# Patient Record
Sex: FEMALE | Race: WHITE | NOT HISPANIC OR LATINO | Employment: OTHER | URBAN - METROPOLITAN AREA
[De-identification: names, ages, dates, MRNs, and addresses within clinical notes are randomized per-mention and may not be internally consistent; named-entity substitution may affect disease eponyms.]

---

## 2017-06-05 ENCOUNTER — OFFICE VISIT (OUTPATIENT)
Dept: OPHTHALMOLOGY | Facility: CLINIC | Age: 77
End: 2017-06-05

## 2017-06-05 DIAGNOSIS — H52.223 REGULAR ASTIGMATISM, BILATERAL: ICD-10-CM

## 2017-06-05 DIAGNOSIS — H35.3290 EXUDATIVE SENILE MACULAR DEGENERATION OF RETINA (H): Primary | ICD-10-CM

## 2017-06-05 DIAGNOSIS — H35.369 DRUSEN (DEGENERATIVE) OF RETINA, UNSPECIFIED LATERALITY: ICD-10-CM

## 2017-06-05 ASSESSMENT — SLIT LAMP EXAM - LIDS
COMMENTS: NORMAL
COMMENTS: NORMAL

## 2017-06-05 ASSESSMENT — REFRACTION_MANIFEST
OS_AXIS: 062
OD_CYLINDER: +0.50
OD_SPHERE: PLANO
OD_ADD: +2.75
OS_SPHERE: +0.25
OD_AXIS: 050
OS_ADD: +2.75
OS_CYLINDER: +0.50

## 2017-06-05 ASSESSMENT — VISUAL ACUITY
CORRECTION_TYPE: GLASSES
OD_CC: 20/20-
OS_CC: 20/30-/+
METHOD: SNELLEN - LINEAR

## 2017-06-05 ASSESSMENT — REFRACTION_WEARINGRX
OS_AXIS: 006
OD_CYLINDER: +0.50
OD_SPHERE: PLANO
OD_ADD: +2.50
OS_SPHERE: PLANO
OS_CYLINDER: +0.50
OS_ADD: +2.50
OD_AXIS: 047
SPECS_TYPE: PAL

## 2017-06-05 ASSESSMENT — TONOMETRY
OD_IOP_MMHG: 15
IOP_METHOD: ICARE
OS_IOP_MMHG: 14

## 2017-06-05 ASSESSMENT — CUP TO DISC RATIO
OD_RATIO: 0.25
OS_RATIO: 0.25

## 2017-06-05 ASSESSMENT — CONF VISUAL FIELD
OD_NORMAL: 1
METHOD: COUNTING FINGERS
OS_NORMAL: 1

## 2017-06-05 ASSESSMENT — EXTERNAL EXAM - LEFT EYE: OS_EXAM: NORMAL

## 2017-06-05 ASSESSMENT — EXTERNAL EXAM - RIGHT EYE: OD_EXAM: NORMAL

## 2017-06-05 NOTE — NURSING NOTE
"Chief Complaints and History of Present Illnesses   Patient presents with     COMPREHENSIVE EYE EXAM     HPI    Affected eye(s):  Both   Symptoms:     Difficulty with reading (Comment: vision seems to be more impaired at near)      Duration:  1 year   Frequency:  Constant       Do you have eye pain now?:  No      Comments:  Eyes occasionally just \"ache\" at the end of the day when she has been reading but it also happens other times - transient    Avril Denson, HETAL 8:24 AM 06/05/2017               "

## 2017-06-05 NOTE — PROGRESS NOTES
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 or sooner as needed.    Complete documentation of historical and exam elements from today's encounter can  be found in the full encounter summary report (not reduplicated in this progress  note). I personally obtained the chief complaint(s) and history of present illness. I  confirmed and edited as necessary the review of systems, past medical/surgical  history, family history, social history, and examination findings as documented by  others; and I examined the patient myself. I personally reviewed the relevant tests,  images, and reports as documented above. I formulated and edited as necessary the  assessment and plan and discussed the findings and management plan with the  patient and family.    DAVID Barrow M.D

## 2017-06-05 NOTE — MR AVS SNAPSHOT
After Visit Summary   6/5/2017    Edyta Rivas    MRN: [id number]           Patient Information     Date Of Birth          1940        Visit Information        Provider Department      6/5/2017 8:20 AM Preet Barrow MD [facility]        Today's Diagnoses     Exudative senile macular degeneration of retina (H) - Left Eye    -  1    Drusen (degenerative) of retina, unspecified laterality - Both Eyes        Regular astigmatism, bilateral           Follow-ups after your visit        Follow-up notes from your care team     Return in about 1 year (around 6/5/2018) for Complete Eye Exam, ARMD.      Who to contact     Please call your clinic at [phone] to:    Ask questions about your health    Make or cancel appointments    Discuss your medicines    Learn about your test results    Speak to your doctor   If you have compliments or concerns about an experience at your clinic, or if you wish to file a complaint, please contact [facility] Patient Relations at [phone] or email us at [email or web address]         Additional Information About Your Visit        MyChart Information     BeiBei gives you secure access to your electronic health record. If you see a primary care provider, you can also send messages to your care team and make appointments. If you have questions, please call your primary care clinic.  If you do not have a primary care provider, please call [phone] and they will assist you.      BeiBei is an electronic gateway that provides easy, online access to your medical records. With BeiBei, you can request a clinic appointment, read your test results, renew a prescription or communicate with your care team.     To access your existing account, please contact your [facility] or call [phone] for assistance.        Care EveryWhere ID     This is your Care EveryWhere ID.  This could be used by other organizations to access your [place] medical records  [id number]         Blood Pressure from Last 3 Encounters:   11/17/08 130/58   11/10/08 123/68    Weight from Last 3 Encounters:   No data found for Wt              Today, you had the following     No orders found for display       Primary Care Provider Office Phone # Fax #    Carolyn Sarkar [phone]       [facility] [address] [place] [address]        Thank you!     Thank you for choosing [facility]  for your care. Our goal is always to provide you with excellent care. Hearing back from our patients is one way we can continue to improve our services. Please take a few minutes to complete the written survey that you may receive in the mail after your visit with us. Thank you!             Your Updated Medication List - Protect others around you: Learn how to safely use, store and throw away your medicines at www.disposemymeds.org.          This list is accurate as of: 6/5/17  9:25 AM.  Always use your most recent med list.                   Brand Name Dispense Instructions for use    aspirin 81 MG tablet      Take by mouth daily       bevacizumab 1.25MG/0.05ML intravitreal inj    AVASTIN     1.25 mg by Intravitreal route once Left eye every 4 weeks       cholecalciferol 1000 UNITS Tabs          glucosamine-chondroitin 500-400 MG Caps per capsule      Take 1 capsule by mouth daily       ICAPS MV PO          OMEGA-3 FISH OIL PO          traZODone 50 MG tablet    DESYREL     Take by mouth At Bedtime       ZETIA PO

## 2018-06-06 ENCOUNTER — OFFICE VISIT (OUTPATIENT)
Dept: OPHTHALMOLOGY | Facility: CLINIC | Age: 78
End: 2018-06-06
Payer: COMMERCIAL

## 2018-06-06 DIAGNOSIS — H35.3110 DRY AGE-RELATED MACULAR DEGENERATION OF RIGHT EYE: ICD-10-CM

## 2018-06-06 DIAGNOSIS — Z96.1 PSEUDOPHAKIA, BOTH EYES: ICD-10-CM

## 2018-06-06 DIAGNOSIS — H52.03 HYPERMETROPIA OF BOTH EYES: ICD-10-CM

## 2018-06-06 DIAGNOSIS — H43.813 PVD (POSTERIOR VITREOUS DETACHMENT), BILATERAL: ICD-10-CM

## 2018-06-06 DIAGNOSIS — H35.3290 EXUDATIVE SENILE MACULAR DEGENERATION OF RETINA (H): Primary | ICD-10-CM

## 2018-06-06 ASSESSMENT — REFRACTION_WEARINGRX
OD_CYLINDER: +0.50
OD_ADD: +2.75
OS_SPHERE: +0.25
OD_SPHERE: PLANO
OS_AXIS: 060
SPECS_TYPE: PAL
OS_CYLINDER: +0.50
OS_ADD: +2.75
OD_AXIS: 048

## 2018-06-06 ASSESSMENT — VISUAL ACUITY
OS_CC: J5
OD_CC: 20/20
METHOD: SNELLEN - LINEAR
OS_CC: 20/40
OD_CC+: -2
OD_CC: J1

## 2018-06-06 ASSESSMENT — REFRACTION_MANIFEST
OD_CYLINDER: +0.50
OD_ADD: +3.00
OD_AXIS: 040
OS_ADD: +3.00
OD_SPHERE: +0.25
OS_AXIS: 074
OS_CYLINDER: +0.75
OS_SPHERE: +0.25

## 2018-06-06 ASSESSMENT — CONF VISUAL FIELD
METHOD: COUNTING FINGERS
OD_NORMAL: 1
OS_NORMAL: 1

## 2018-06-06 ASSESSMENT — EXTERNAL EXAM - LEFT EYE: OS_EXAM: NORMAL

## 2018-06-06 ASSESSMENT — CUP TO DISC RATIO
OS_RATIO: 0.25
OD_RATIO: 0.25

## 2018-06-06 ASSESSMENT — EXTERNAL EXAM - RIGHT EYE: OD_EXAM: NORMAL

## 2018-06-06 ASSESSMENT — TONOMETRY
OD_IOP_MMHG: 14
IOP_METHOD: TONOPEN
OS_IOP_MMHG: 15

## 2018-06-06 ASSESSMENT — SLIT LAMP EXAM - LIDS
COMMENTS: NORMAL
COMMENTS: NORMAL

## 2018-06-06 NOTE — MR AVS SNAPSHOT
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      Who to contact     Please call your clinic at [phone] to:    Ask questions about your health    Make or cancel appointments    Discuss your medicines    Learn about your test results    Speak to your doctor            Additional Information About Your Visit        [employer] Information     Values of n gives you secure access to your electronic health record. If you see a primary care provider, you can also send messages to your care team and make appointments. If you have questions, please call your primary care clinic.  If you do not have a primary care provider, please call [phone] and they will assist you.      Values of n is an electronic gateway that provides easy, online access to your medical records. With Values of n, you can request a clinic appointment, read your test results, renew a prescription or communicate with your care team.     To access your existing account, please contact your [facility] or call [phone] for assistance.        Care EveryWhere ID     This is your Care EveryWhere ID. This could be used by other organizations to access your [place] medical records  [id number]         Blood Pressure from Last 3  Encounters:   11/17/08 130/58   11/10/08 123/68    Weight from Last 3 Encounters:   No data found for Wt              We Performed the Following     REFRACTION        Primary Care Provider Office Phone # Fax #    Carolyn Sarkar [phone]       [facility] [address] [place] [address] E  [place] [address]        Equal Access to Services     [facility]: Hadii aad ku hadasho Soomaali, waaxda luqadaha, qaybta kaalmada adeegyada, waxay idiin hayaan adenu ralph lasergio . So [facility] [phone].    ATENCIÓN: Si habla español, tiene a lazcano disposición servicios gratuitos de asistencia lingüística. Lashaeame al [phone].    We comply with applicable federal civil rights laws and [place] laws. We do not discriminate on the basis of race, color, national origin, age, disability, sex, sexual orientation, or gender identity.            Thank you!     Thank you for choosing [facility]  for your care. Our goal is always to provide you with excellent care. Hearing back from our patients is one way we can continue to improve our services. Please take a few minutes to complete the written survey that you may receive in the mail after your visit with us. Thank you!             Your Updated Medication List - Protect others around you: Learn how to safely use, store and throw away your medicines at www.disposemymeds.org.          This list is accurate as of 6/6/18 10:30 AM.  Always use your most recent med list.                   Brand Name Dispense Instructions for use Diagnosis    aspirin 81 MG tablet      Take by mouth daily        bevacizumab 25 mg/mL intravitreal inj    AVASTIN     1.25 mg by Intravitreal route once Left eye every 4 weeks        cholecalciferol 1000 units Tabs           glucosamine-chondroitin 500-400 MG Caps per capsule      Take 1 capsule by mouth daily        ICAPS MV PO           OMEGA-3 FISH OIL PO           traZODone 50 MG tablet    DESYREL     Take by mouth At  Bedtime        ZETIA PO

## 2018-06-06 NOTE — PROGRESS NOTES
HPI  Edyta Rivas is a 78 year old female here for comprehensive eye exam.  Has mild blurry vision LEFT eye with current glasses, particularly with cooking and reading.  Has magnifier she uses occasionally.  Denies eye pain, or irritation.  Seeing Dr. Washburn every 5 weeks for Avastin injections left eye.       PMH: aortic stenosis, hyperlipidemia, on asa  POH: Glasses for hypermetropia, cataract extraction/ intraocular lens implant both eyes 2016, s/p YAG capsulotomy left, dry age related macular degeneration right eye, wet age related macular degeneration left eye - NOEMI Washburn follows  Oc Meds: none  FH: Denies any glaucoma, age related macular degeneration, or other known eye diseases         Assessment & Plan   (H35.4690) Exudative senile macular degeneration of retina (H) - Left Eye  (primary encounter diagnosis)  Comment: good response to avastin, continues getting injections  Plan: follow with NOEMI Washburn    (H52.03) 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  Comment: minimal change  Plan: REFRACTION- prescription given to update as needed with slight increase in add, advised patient to wait unless current glasses in poor condition    (H35.5600) Dry age-related macular degeneration of right eye - Right Eye  Comment: taking I vites  Plan: follow with NOEMI Washburn, continue I vites    (Z96.1) Pseudophakia, both eyes - Both Eyes  Comment: s/p YAG capsulotomy left eye, clear media right eye   Plan: follow     (H43.819) PVD (posterior vitreous detachment), unspecified laterality - right Eye  Comment:  Likely old   Plan:  Natural history of posterior vitreous detachment as well as retinal tear/detachment symptoms discussed with patient.  Told to call for prompt dilated exam if these symptoms occur.      -----------------------------------------------------------------------------------       Patient disposition:   Return in about 1 year (around 6/6/2019) for Comprehensive Exam.      Complete documentation of historical  and exam elements from today's encounter can be found in the full encounter summary report (not reduplicated in this progress note). I personally obtained the chief complaint(s) and history of present illness.  I have confirmed and edited as necessary the CC, HPI, PMH/PSH, social history, FMH, ROS, and exam/neuro findings as obtained by the technician or others. I have examined this patient myself and I personally viewed the image(s) and studies listed above and the documentation reflects my findings and interpretation.  I formulated and edited as necessary the assessment and plan and discussed the findings and management plan with the patient and family.     Clarisa Bloom MD

## 2018-06-06 NOTE — NURSING NOTE
Chief Complaints and History of Present Illnesses   Patient presents with     Annual Eye Exam     HPI    Affected eye(s):  Both   Symptoms:     Decreased vision (Comment: at near)   Difficulty with reading (Comment: at near)   No floaters   No flashes   No Dryness      Unknown duration Other:  gradual   Frequency:  Constant       Do you have eye pain now?:  No      Comments:  Has wet AMD and notes diminished VA at near. Received injection [facility].  Icaps BID.  Denies art tears.  Rosalba FERNANDO 9:33 AM 06/06/2018

## 2018-12-17 ENCOUNTER — TRANSFERRED RECORDS (OUTPATIENT)
Dept: HEALTH INFORMATION MANAGEMENT | Facility: CLINIC | Age: 78
End: 2018-12-17

## 2019-02-11 ENCOUNTER — PRE VISIT (OUTPATIENT)
Dept: ORTHOPEDICS | Facility: CLINIC | Age: 79
End: 2019-02-11

## 2019-02-11 NOTE — TELEPHONE ENCOUNTER
RECORDS RECEIVED FROM: JG   DATE RECEIVED: IN CARE EVERYWHERE   NOTES STATUS DETAILS   OFFICE NOTE from referring provider CARE EVERY WHERE    OFFICE NOTE from other specialist CARE EVERYWHERE    DISCHARGE SUMMARY from hospital N/A    DISCHARGE REPORT from the ER N/A    OPERATIVE REPORT N/A    MEDICATION LIST CARE EVERYWHERE    IMPLANT RECORD/STICKER N/A    LABS     CBC/DIFF N/A    CULTURES N/A    INJECTIONS DONE IN RADIOLOGY N/A    MRI N/A    CT SCAN N/A    XRAYS (IMAGES & REPORTS) Received IN PACS   TUMOR     PATHOLOGY  Slides & report N/A

## 2019-02-20 ASSESSMENT — ENCOUNTER SYMPTOMS
MYALGIAS: 1
NECK PAIN: 0
STIFFNESS: 1
DIFFICULTY URINATING: 0
MUSCLE WEAKNESS: 0
EYE IRRITATION: 0
HEMATURIA: 0
FLANK PAIN: 0
DOUBLE VISION: 0
BACK PAIN: 1
ARTHRALGIAS: 0
MUSCLE CRAMPS: 1
JOINT SWELLING: 0
DYSURIA: 0
EYE WATERING: 1
EYE PAIN: 1
EYE REDNESS: 0

## 2019-03-06 ENCOUNTER — ANCILLARY PROCEDURE (OUTPATIENT)
Dept: GENERAL RADIOLOGY | Facility: CLINIC | Age: 79
End: 2019-03-06
Attending: ORTHOPAEDIC SURGERY
Payer: COMMERCIAL

## 2019-03-06 ENCOUNTER — OFFICE VISIT (OUTPATIENT)
Dept: ORTHOPEDICS | Facility: CLINIC | Age: 79
End: 2019-03-06
Payer: COMMERCIAL

## 2019-03-06 VITALS — BODY MASS INDEX: 25.58 KG/M2 | WEIGHT: 139 LBS | HEIGHT: 62 IN

## 2019-03-06 DIAGNOSIS — M53.3 SACRALGIA: Primary | ICD-10-CM

## 2019-03-06 DIAGNOSIS — M41.9 SCOLIOSIS: Primary | ICD-10-CM

## 2019-03-06 ASSESSMENT — MIFFLIN-ST. JEOR: SCORE: 1053.75

## 2019-03-06 NOTE — LETTER
3/6/2019       RE: Edyta Rivas  [address]  [place] [address]     Dear Colleague,    Thank you for referring your patient, Edyta Rivas, to the [facility]. Please see a copy of my visit note below.    [facility]  Orthopedics  Jorge Luis Fierro MD  2019     Name: Edyta Rivas  MRN: [id number]  Age: 79 year old  : 1940  Referring provider: Mason Iniguez     Chief Complaint: Consult (si joint pain )     Date of Injury: unk    History of Present Illness:   Edyta Rivas is a 79 year old, right handed female who presents today for evaluation of lower back pain.    Mrs. Rivas is a very pleasant 79-year-old female who around start of the new year developed lower back and tailbone pain without inciting event.  She had no antecedent back pain prior to this event.  She does have a notable orthopedic spine history consisting of a L2 vertebroplasty back in around  for osteoporotic compression fracture.  She has a past medical history notable for hypertension, osteoporosis, and scoliosis since childhood.  She does undergo an annual once a year infusion for osteoporosis treatment.  Otherwise she has not undergone any recent focused physical therapy on her spine and has received no form of corticosteroid injection.    Patient reports that the onset of this pain at the new year was most bothersome when she would sit on hard surfaces or for extended periods of time.  Over the last 3 months though this pain has greatly improved and nearly resolved without intervention.  She has been basically using more questions on harder sitting surfaces I modified her activity.  She has been taking anti-inflammatories for pain control but otherwise has not had any residual impairment of her ability to ambulate.  She is very active at baseline and ambulates independently without assist device.  She has no radiating pain into her buttocks  or her legs bilaterally or upper spine.  She does not recall any form of trauma such as a low energy fall.    Oswestry (JELENA) Questionnaire    OSWESTRY DISABILITY INDEX 2/20/2019   Count 10   Sum 4   Oswestry Score (%) 8   Some recent data might be hidden        PROMIS-10 Scores  Global Mental Health Score: (P) 19  Global Physical Health Score: (P) 16  PROMIS TOTAL - SUBSCORES: (P) 35       Review of Systems:   A 10-point review of systems was obtained and is negative except for as noted in the HPI.     Medications:     Current Outpatient Medications:      aspirin 81 MG tablet, Take by mouth daily, Disp: , Rfl:      bevacizumab (AVASTIN) 1.25MG/0.05ML, 1.25 mg by Intravitreal route once Left eye every 4 weeks, Disp: , Rfl:      cholecalciferol 1000 UNITS TABS, , Disp: , Rfl:      Ezetimibe (ZETIA PO), , Disp: , Rfl:      glucosamine-chondroitin 500-400 MG CAPS, Take 1 capsule by mouth daily, Disp: , Rfl:      Multiple Vitamins-Minerals (ICAPS MV PO), , Disp: , Rfl:      Omega-3 Fatty Acids (OMEGA-3 FISH OIL PO), , Disp: , Rfl:      traZODone (DESYREL) 50 MG tablet, Take by mouth At Bedtime, Disp: , Rfl:     Allergies:  No Known Allergies    Past Medical History:  Past Medical History:   Diagnosis Date     Macular degeneration        Past Surgical History:  Past Surgical History:   Procedure Laterality Date     CATARACT IOL, RT/LT          Social History:  Patient lives with with her   Ambulates independently without assist device  Non-smoker    Social History     Socioeconomic History     Marital status:      Spouse name: Not on file     Number of children: Not on file     Years of education: Not on file     Highest education level: Not on file   Occupational History     Not on file   Social Needs     Financial resource strain: Not on file     Food insecurity:     Worry: Not on file     Inability: Not on file     Transportation needs:     Medical: Not on file     Non-medical: Not on file   Tobacco Use  "    Smoking status: Never Smoker     Smokeless tobacco: Never Used   Substance and Sexual Activity     Alcohol use: Not on file     Drug use: Not on file     Sexual activity: Not on file   Lifestyle     Physical activity:     Days per week: Not on file     Minutes per session: Not on file     Stress: Not on file   Relationships     Social connections:     Talks on phone: Not on file     Gets together: Not on file     Attends Jehovah's witness service: Not on file     Active member of club or organization: Not on file     Attends meetings of clubs or organizations: Not on file     Relationship status: Not on file     Intimate partner violence:     Fear of current or ex partner: Not on file     Emotionally abused: Not on file     Physically abused: Not on file     Forced sexual activity: Not on file   Other Topics Concern     Not on file   Social History Narrative     Not on file       Family History:  Family History   Problem Relation Age of Onset     Diabetes No family hx of      Glaucoma No family hx of      Eye Surgery No family hx of      Macular Degeneration No family hx of        Physical Examination:  Height 1.575 m (5' 2\"), weight 63 kg (139 lb).  Well-appearing female appropriate for stated age  No acute physical distress  Nonlabored breathing pattern without the use of accessory muscles  No focal deficits appreciated in the cranial nerves  Appropriate mood and affect alert and oriented x3  Catching musculoskeletal:  Patient gains a standing position with ease noticed some stiffness  Demonstrates a heel to toe normal nonantalgic gait with a hyper kyphotic thoracic posture  Motor and sensory exam.  In the L2-S1 distribution is symmetrical and otherwise benign  Provocative testing for the sacroiliac joints otherwise negative with out any tenderness to palpation to either SI joint.  No tenderness to percussion.  Vickie negative bilaterally    Imaging:   Radiographs (03/06/2019)  Upright AP and lateral full-length (full " body) spine x-rays demonstrate a hyper accentuated curvature on sagittal view with a lumbar lordosis of 80 degrees; pelvic incidence 75 degrees; pelvic tilt 27 degrees.    In the coronal plane there is a right thoracolumbar curvature of 18 degrees with a compensatory  upper thoracic left curvature of 15 degrees.    Full body x-rays in the sagittal plane demonstrate slight flexion at the hips and knees in association with pelvic retroversion    No evidence of fracture in the sacrum or pelvis.     I have independently reviewed the above imaging studies; the results were discussed with the patient.     Assessment:   79 year old female with 2 months of lower back/sacral pain likely secondary to a sacral insufficiency fracture stemming from her advanced osteoporosis.  This is more likely than not gone on to heal in 3 months time that she had onset of pain until her presentation here in spine clinic.  Given that her symptoms have resolved and she is without deficit there is no recommendation for intervention at this time.  That being said the patient has a return of onset of symptoms it may be beneficial for her to revisit her osteoporosis therapy for something more aggressive.    Plan:   -No intervention or surgery recommended at this time  -Continue osteoporosis once a year with infusions, vitamin D and calcium supplementation, high activity level  -If there is another acute onset of low back pain the patient may call in to clinic have a sacral MRI completed to rule out insufficiency fracture  -If symptoms return or a new incident occurs then could consider transitioning to a more aggressive osteoporotic therapy regimen  -May return PRN    Patient seen and discussed with Dr. Sri Gunderson MD 03/06/2019  Orthopaedic Surgery Resident, PGY-4  Pager: [phone]    I saw and evaluated the patient and developed the plan.    Jorge Luis Fierro MD

## 2019-03-06 NOTE — NURSING NOTE
"Reason For Visit:   Chief Complaint   Patient presents with     Consult     si joint pain        Primary MD: Carolyn Sarkar  Ref. MD: jose    ?  No  Occupation retuired.  Currently working? No.  Work status?  Retired.  Date of injury: late December   Type of injury: chronic .  Date of surgery: 2005?  Type of surgery: vertebroplasty  .  Smoker: No  Request smoking cessation information: No    Ht 1.575 m (5' 2\")   Wt 63 kg (139 lb)   BMI 25.42 kg/m      Pain Assessment  Patient Currently in Pain: Yes  0-10 Pain Scale: 2  Primary Pain Location: Back    Oswestry (JELENA) Questionnaire    OSWESTRY DISABILITY INDEX 2/20/2019   Count 10   Sum 4   Oswestry Score (%) 8   Some recent data might be hidden            Neck Disability Index (NDI) Questionnaire    No flowsheet data found.                Promis 10 Assessment    PROMIS 10 2/20/2019   In general, would you say your health is: Very good   In general, would you say your quality of life is: Very good   In general, how would you rate your physical health? Good   In general, how would you rate your mental health, including your mood and your ability to think? Excellent   In general, how would you rate your satisfaction with your social activities and relationships? Excellent   In general, please rate how well you carry out your usual social activities and roles Excellent   To what extent are you able to carry out your everyday physical activities such as walking, climbing stairs, carrying groceries, or moving a chair? Completely   How often have you been bothered by emotional problems such as feeling anxious, depressed or irritable? Never   How would you rate your fatigue on average? Mild   How would you rate your pain on average?   0 = No Pain  to  10 = Worst Imaginable Pain 3   In general, would you say your health is: 4   In general, would you say your quality of life is: 4   In general, how would you rate your physical health? 3   In general, how would " you rate your mental health, including your mood and your ability to think? 5   In general, how would you rate your satisfaction with your social activities and relationships? 5   In general, please rate how well you carry out your usual social activities and roles. (This includes activities at home, at work and in your community, and responsibilities as a parent, child, spouse, employee, friend, etc.) 5   To what extent are you able to carry out your everyday physical activities such as walking, climbing stairs, carrying groceries, or moving a chair? 5   In the past 7 days, how often have you been bothered by emotional problems such as feeling anxious, depressed, or irritable? 1   In the past 7 days, how would you rate your fatigue on average? 2   In the past 7 days, how would you rate your pain on average, where 0 means no pain, and 10 means worst imaginable pain? 3   Global Mental Health Score 19   Global Physical Health Score 16   PROMIS TOTAL - SUBSCORES 35   Some recent data might be hidden                Spencer Corley ATC

## 2019-03-06 NOTE — PROGRESS NOTES
[facility]  Orthopedics  Jorge Luis Fierro MD  2019     Name: Edyta Rivas  MRN: [id number]  Age: 79 year old  : 1940  Referring provider: Mason Iniguez     Chief Complaint: Consult (si joint pain )       Date of Injury: unk    History of Present Illness:   Edyta Rivas is a 79 year old, right handed female who presents today for evaluation of lower back pain.    Mrs. Rivas is a very pleasant 79-year-old female who around start of the new year developed lower back and tailbone pain without inciting event.  She had no antecedent back pain prior to this event.  She does have a notable orthopedic spine history consisting of a L2 vertebroplasty back in around  for osteoporotic compression fracture.  She has a past medical history notable for hypertension, osteoporosis, and scoliosis since childhood.  She does undergo an annual once a year infusion for osteoporosis treatment.  Otherwise she has not undergone any recent focused physical therapy on her spine and has received no form of corticosteroid injection.    Patient reports that the onset of this pain at the new year was most bothersome when she would sit on hard surfaces or for extended periods of time.  Over the last 3 months though this pain has greatly improved and nearly resolved without intervention.  She has been basically using more questions on harder sitting surfaces I modified her activity.  She has been taking anti-inflammatories for pain control but otherwise has not had any residual impairment of her ability to ambulate.  She is very active at baseline and ambulates independently without assist device.  She has no radiating pain into her buttocks or her legs bilaterally or upper spine.  She does not recall any form of trauma such as a low energy fall.    Oswestry (JELENA) Questionnaire    OSWESTRY DISABILITY INDEX 2019   Count 10   Sum 4   Oswestry Score (%) 8   Some recent data might be hidden        PROMIS-10 Scores  Global  Mental Health Score: (P) 19  Global Physical Health Score: (P) 16  PROMIS TOTAL - SUBSCORES: (P) 35         Review of Systems:   A 10-point review of systems was obtained and is negative except for as noted in the HPI.     Medications:     Current Outpatient Medications:      aspirin 81 MG tablet, Take by mouth daily, Disp: , Rfl:      bevacizumab (AVASTIN) 1.25MG/0.05ML, 1.25 mg by Intravitreal route once Left eye every 4 weeks, Disp: , Rfl:      cholecalciferol 1000 UNITS TABS, , Disp: , Rfl:      Ezetimibe (ZETIA PO), , Disp: , Rfl:      glucosamine-chondroitin 500-400 MG CAPS, Take 1 capsule by mouth daily, Disp: , Rfl:      Multiple Vitamins-Minerals (ICAPS MV PO), , Disp: , Rfl:      Omega-3 Fatty Acids (OMEGA-3 FISH OIL PO), , Disp: , Rfl:      traZODone (DESYREL) 50 MG tablet, Take by mouth At Bedtime, Disp: , Rfl:     Allergies:  No Known Allergies    Past Medical History:  Past Medical History:   Diagnosis Date     Macular degeneration        Past Surgical History:  Past Surgical History:   Procedure Laterality Date     CATARACT IOL, RT/LT          Social History:  Patient lives with with her   Ambulates independently without assist device  Non-smoker    Social History     Socioeconomic History     Marital status:      Spouse name: Not on file     Number of children: Not on file     Years of education: Not on file     Highest education level: Not on file   Occupational History     Not on file   Social Needs     Financial resource strain: Not on file     Food insecurity:     Worry: Not on file     Inability: Not on file     Transportation needs:     Medical: Not on file     Non-medical: Not on file   Tobacco Use     Smoking status: Never Smoker     Smokeless tobacco: Never Used   Substance and Sexual Activity     Alcohol use: Not on file     Drug use: Not on file     Sexual activity: Not on file   Lifestyle     Physical activity:     Days per week: Not on file     Minutes per session: Not on  "file     Stress: Not on file   Relationships     Social connections:     Talks on phone: Not on file     Gets together: Not on file     Attends [employer] service: Not on file     Active member of club or organization: Not on file     Attends meetings of clubs or organizations: Not on file     Relationship status: Not on file     Intimate partner violence:     Fear of current or ex partner: Not on file     Emotionally abused: Not on file     Physically abused: Not on file     Forced sexual activity: Not on file   Other Topics Concern     Not on file   Social History Narrative     Not on file       Family History:  Family History   Problem Relation Age of Onset     Diabetes No family hx of      Glaucoma No family hx of      Eye Surgery No family hx of      Macular Degeneration No family hx of        Physical Examination:  Height 1.575 m (5' 2\"), weight 63 kg (139 lb).  Well-appearing female appropriate for stated age  No acute physical distress  Nonlabored breathing pattern without the use of accessory muscles  No focal deficits appreciated in the cranial nerves  Appropriate mood and affect alert and oriented x3  Catching musculoskeletal:  Patient gains a standing position with ease noticed some stiffness  Demonstrates a heel to toe normal nonantalgic gait with a hyper kyphotic thoracic posture  Motor and sensory exam.  In the L2-S1 distribution is symmetrical and otherwise benign  Provocative testing for the sacroiliac joints otherwise negative with out any tenderness to palpation to either SI joint.  No tenderness to percussion.  Vickie negative bilaterally    Imaging:   Radiographs (03/06/2019)  Upright AP and lateral full-length (full body) spine x-rays demonstrate a hyper accentuated curvature on sagittal view with a lumbar lordosis of 80 degrees; pelvic incidence 75 degrees; pelvic tilt 27 degrees.    In the coronal plane there is a right thoracolumbar curvature of 18 degrees with a compensatory  upper thoracic " left curvature of 15 degrees.    Full body x-rays in the sagittal plane demonstrate slight flexion at the hips and knees in association with pelvic retroversion    No evidence of fracture in the sacrum or pelvis.     I have independently reviewed the above imaging studies; the results were discussed with the patient.       Assessment:   79 year old female with 2 months of lower back/sacral pain likely secondary to a sacral insufficiency fracture stemming from her advanced osteoporosis.  This is more likely than not gone on to heal in 3 months time that she had onset of pain until her presentation here in spine clinic.  Given that her symptoms have resolved and she is without deficit there is no recommendation for intervention at this time.  That being said the patient has a return of onset of symptoms it may be beneficial for her to revisit her osteoporosis therapy for something more aggressive.    Plan:   -No intervention or surgery recommended at this time  -Continue osteoporosis once a year with infusions, vitamin D and calcium supplementation, high activity level  -If there is another acute onset of low back pain the patient may call in to clinic have a sacral MRI completed to rule out insufficiency fracture  -If symptoms return or a new incident occurs then could consider transitioning to a more aggressive osteoporotic therapy regimen  -May return PRN    Patient seen and discussed with Dr. Sri Gunderson MD 03/06/2019  Orthopaedic Surgery Resident, PGY-4  Pager: [phone]    I saw and evaluated the patient and developed the plan.

## 2019-03-12 ENCOUNTER — TRANSFERRED RECORDS (OUTPATIENT)
Dept: HEALTH INFORMATION MANAGEMENT | Facility: CLINIC | Age: 79
End: 2019-03-12

## 2019-09-24 ENCOUNTER — TRANSFERRED RECORDS (OUTPATIENT)
Dept: HEALTH INFORMATION MANAGEMENT | Facility: CLINIC | Age: 79
End: 2019-09-24

## 2019-10-04 ENCOUNTER — HEALTH MAINTENANCE LETTER (OUTPATIENT)
Age: 79
End: 2019-10-04

## 2019-10-24 ENCOUNTER — OFFICE VISIT (OUTPATIENT)
Dept: OPHTHALMOLOGY | Facility: CLINIC | Age: 79
End: 2019-10-24
Payer: COMMERCIAL

## 2019-10-24 DIAGNOSIS — H35.3220 EXUDATIVE AGE-RELATED MACULAR DEGENERATION OF LEFT EYE, UNSPECIFIED STAGE (H): ICD-10-CM

## 2019-10-24 DIAGNOSIS — H35.3110 NONEXUDATIVE AGE-RELATED MACULAR DEGENERATION OF RIGHT EYE, UNSPECIFIED STAGE: ICD-10-CM

## 2019-10-24 DIAGNOSIS — H52.00 HYPERMETROPIA, UNSPECIFIED LATERALITY: ICD-10-CM

## 2019-10-24 DIAGNOSIS — Z96.1 PSEUDOPHAKIA, BOTH EYES: Primary | ICD-10-CM

## 2019-10-24 RX ORDER — AMLODIPINE BESYLATE 5 MG/1
5 TABLET ORAL DAILY
COMMUNITY

## 2019-10-24 RX ORDER — HYDROCHLOROTHIAZIDE 25 MG/1
25 TABLET ORAL DAILY
COMMUNITY

## 2019-10-24 RX ORDER — ATORVASTATIN CALCIUM 40 MG/1
40 TABLET, FILM COATED ORAL DAILY
COMMUNITY

## 2019-10-24 RX ORDER — TRAZODONE HYDROCHLORIDE 50 MG/1
50 TABLET, FILM COATED ORAL AT BEDTIME
COMMUNITY

## 2019-10-24 ASSESSMENT — VISUAL ACUITY
CORRECTION_TYPE: GLASSES
METHOD: SNELLEN - LINEAR
OD_CC: 20/25
OS_CC+: -1
OS_CC: 20/50
OD_CC+: -3

## 2019-10-24 ASSESSMENT — REFRACTION_MANIFEST
OS_ADD: +2.75
OS_CYLINDER: +0.50
OD_CYLINDER: SPHERE
OD_ADD: +2.75
OD_SPHERE: +1.00
OS_SPHERE: +0.50
OS_AXIS: 060

## 2019-10-24 ASSESSMENT — TONOMETRY
IOP_METHOD: ICARE
OS_IOP_MMHG: 09
OD_IOP_MMHG: 09

## 2019-10-24 ASSESSMENT — REFRACTION_WEARINGRX
OS_ADD: +2.75
OD_ADD: +2.75
SPECS_TYPE: PAL
OD_SPHERE: PLANO
OS_CYLINDER: +0.50
OD_CYLINDER: +0.50
OS_SPHERE: +0.25
OD_AXIS: 048
OS_AXIS: 060

## 2019-10-24 ASSESSMENT — CONF VISUAL FIELD
OS_NORMAL: 1
METHOD: COUNTING FINGERS
OD_NORMAL: 1

## 2019-10-24 NOTE — NURSING NOTE
Chief Complaints and History of Present Illnesses   Patient presents with     COMPREHENSIVE EYE EXAM     Chief Complaint(s) and History of Present Illness(es)     COMPREHENSIVE EYE EXAM     Laterality: both eyes    Quality: blurred vision    Associated symptoms: Negative for floaters, flashes, dryness, eye pain and tearing              Comments     Patient notes that it is very difficult to see small print and contrasts is challenging    Herminia Jamil October 24, 2019 8:58 AM

## 2019-10-24 NOTE — PROGRESS NOTES
HPI  Edyta Rivas is a 79 year old female here for comprehensive eye exam.  Has mild blurry vision LEFT eye with current glasses, particularly with cooking and reading.  Has magnifier she uses occasionally.  Denies eye pain, or irritation.  Seeing Dr. Washburn for Avastin injections left eye.  Patient notes that it is very difficult to see small print and contrast is challenging, but no major changes in visual acuity.      PMH: aortic stenosis, hyperlipidemia, on asa  POH: Glasses for hypermetropia, cataract extraction/ intraocular lens implant both eyes 2016, s/p YAG capsulotomy left, dry age related macular degeneration right eye, wet age related macular degeneration left eye - NOEMI Washburn follows  Oc Meds: none  FH: Denies any glaucoma, age related macular degeneration, or other known eye diseases         Assessment & Plan       (Z96.1) Pseudophakia, both eyes - Both Eyes  (primary encounter diagnosis)  Comment: s/p YAG capsulotomy left eye, clear media right eye , stable   Plan: follow     (H52.00) Hypermetropia, unspecified laterality - Both Eyes   Comment: mild change   Plan:     Manifest refraction done and prescription for glasses given     (H35.3110) Nonexudative age-related macular degeneration of right eye, unspecified stage - Right Eye  (H35.3220) Exudative age-related macular degeneration of left eye, unspecified stage (H) - Left Eye  Comment: good response to avastin, continues getting injections left eye, good visual acuity right eye   Plan: continue to follow with NOEMI Washburn      -----------------------------------------------------------------------------------       Patient disposition:   Return in about 1 year (around 10/24/2020) for Comprehensive Exam.      Complete documentation of historical and exam elements from today's encounter can be found in the full encounter summary report (not reduplicated in this progress note). I personally obtained the chief complaint(s) and history of present  illness.  I have confirmed and edited as necessary the CC, HPI, PMH/PSH, social history, FMH, ROS, and exam/neuro findings as obtained by the technician or others. I have examined this patient myself and I personally viewed the image(s) and studies listed above and the documentation reflects my findings and interpretation.  I formulated and edited as necessary the assessment and plan and discussed the findings and management plan with the patient and family.     Clarisa Bloom MD

## 2019-11-11 ASSESSMENT — CUP TO DISC RATIO
OD_RATIO: 0.25
OS_RATIO: 0.25

## 2019-11-11 ASSESSMENT — EXTERNAL EXAM - LEFT EYE: OS_EXAM: NORMAL

## 2019-11-11 ASSESSMENT — EXTERNAL EXAM - RIGHT EYE: OD_EXAM: NORMAL

## 2019-11-11 ASSESSMENT — SLIT LAMP EXAM - LIDS
COMMENTS: NORMAL
COMMENTS: NORMAL

## 2020-02-10 ENCOUNTER — HEALTH MAINTENANCE LETTER (OUTPATIENT)
Age: 80
End: 2020-02-10

## 2020-03-18 ENCOUNTER — TRANSFERRED RECORDS (OUTPATIENT)
Dept: HEALTH INFORMATION MANAGEMENT | Facility: CLINIC | Age: 80
End: 2020-03-18

## 2020-11-05 ENCOUNTER — OFFICE VISIT (OUTPATIENT)
Dept: OPHTHALMOLOGY | Facility: CLINIC | Age: 80
End: 2020-11-05
Payer: COMMERCIAL

## 2020-11-05 DIAGNOSIS — Z96.1 PSEUDOPHAKIA, BOTH EYES: Primary | ICD-10-CM

## 2020-11-05 DIAGNOSIS — H35.3110 NONEXUDATIVE AGE-RELATED MACULAR DEGENERATION OF RIGHT EYE, UNSPECIFIED STAGE: ICD-10-CM

## 2020-11-05 DIAGNOSIS — H35.3220 EXUDATIVE AGE-RELATED MACULAR DEGENERATION OF LEFT EYE, UNSPECIFIED STAGE (H): ICD-10-CM

## 2020-11-05 DIAGNOSIS — H52.00 HYPERMETROPIA, UNSPECIFIED LATERALITY: ICD-10-CM

## 2020-11-05 PROCEDURE — 92014 COMPRE OPH EXAM EST PT 1/>: CPT | Performed by: OPHTHALMOLOGY

## 2020-11-05 PROCEDURE — 92015 DETERMINE REFRACTIVE STATE: CPT | Performed by: OPHTHALMOLOGY

## 2020-11-05 ASSESSMENT — REFRACTION_WEARINGRX
OD_CYLINDER: +0.50
OS_SPHERE: +0.25
OD_ADD: +2.75
SPECS_TYPE: PAL
OD_AXIS: 048
OS_AXIS: 060
OD_SPHERE: PLANO
OS_CYLINDER: +0.50
OS_ADD: +2.75

## 2020-11-05 ASSESSMENT — VISUAL ACUITY
OD_CC+: +2
CORRECTION_TYPE: GLASSES
OD_CC: 20/30
OS_CC: 20/60
OD_PH_CC: 20/30
OD_PH_CC+: +3
OS_CC+: -1+2
METHOD: SNELLEN - LINEAR

## 2020-11-05 ASSESSMENT — CUP TO DISC RATIO
OD_RATIO: 0.25
OS_RATIO: 0.25

## 2020-11-05 ASSESSMENT — CONF VISUAL FIELD
OD_NORMAL: 1
METHOD: COUNTING FINGERS
OS_NORMAL: 1

## 2020-11-05 ASSESSMENT — REFRACTION_MANIFEST
OS_SPHERE: +0.50
OD_ADD: +2.50
OD_AXIS: 048
OS_CYLINDER: +0.25
OD_SPHERE: +0.50
OS_ADD: +2.50
OS_AXIS: 060
OD_CYLINDER: +0.25

## 2020-11-05 ASSESSMENT — TONOMETRY
OS_IOP_MMHG: 10
OD_IOP_MMHG: 12
IOP_METHOD: ICARE

## 2020-11-05 ASSESSMENT — SLIT LAMP EXAM - LIDS
COMMENTS: NORMAL
COMMENTS: NORMAL

## 2020-11-05 ASSESSMENT — EXTERNAL EXAM - LEFT EYE: OS_EXAM: NORMAL

## 2020-11-05 ASSESSMENT — EXTERNAL EXAM - RIGHT EYE: OD_EXAM: NORMAL

## 2020-11-05 NOTE — NURSING NOTE
Chief Complaints and History of Present Illnesses   Patient presents with     Follow Up     Chief Complaint(s) and History of Present Illness(es)     Follow Up     Laterality: both eyes    Onset: gradual    Onset: years ago    Associated symptoms: floaters (No change.).  Negative for eye pain, dryness, tearing, flashes, photophobia, glare and haloes    Pain scale: 0/10              Comments     Pt reports vision worse since last visit.  Pt reports wet mac LE.  Pt has a hard time driving when she is in the sun then driving trough shade focusing.    KASSIE Bhakta November 5, 2020 9:34 AM

## 2020-11-05 NOTE — PROGRESS NOTES
HPI  Edyta Rivas is a 80 year old female here for comprehensive eye exam.  Has mild blurry vision LEFT eye with current glasses, and right not quite as good as last visit.  Still getting injections regularly at [facility].   Denies eye pain, or irritation.      PMH: aortic stenosis, hyperlipidemia, on asa  POH: Glasses for hypermetropia, cataract extraction/ intraocular lens implant both eyes 2016, s/p YAG capsulotomy left, dry age related macular degeneration right eye, wet age related macular degeneration left eye - NOEMI Washburn follows  Oc Meds: none  FH: Denies any glaucoma, age related macular degeneration, or other known eye diseases         Assessment & Plan     (Z96.1) Pseudophakia, both eyes - Both Eyes  (primary encounter diagnosis)  Comment: s/p YAG capsulotomy left eye, clear media right eye , stable   Plan: follow     (H52.00) Hypermetropia, unspecified laterality - Both Eyes   Comment: mild change   Plan:     Manifest refraction done and prescription for glasses given     (H35.3110) Nonexudative age-related macular degeneration of right eye, unspecified stage - Right Eye  (H35.3220) Exudative age-related macular degeneration of left eye, unspecified stage (H) - Left Eye  Comment: good response to anti-vegf os, continues getting injections left eye, good visual acuity right eye with no evidence of wet age related macular degeneration on dilated fundus exam   Plan: continue to follow with NOEMI Washburn      -----------------------------------------------------------------------------------       Patient disposition:   Return in about 1 year (around 11/5/2021) for Comprehensive Exam.      Complete documentation of historical and exam elements from today's encounter can be found in the full encounter summary report (not reduplicated in this progress note). I personally obtained the chief complaint(s) and history of present illness.  I have confirmed and edited as necessary the CC, HPI, PMH/PSH, social  history, FMH, ROS, and exam/neuro findings as obtained by the technician or others. I have examined this patient myself and I personally viewed the image(s) and studies listed above and the documentation reflects my findings and interpretation.  I formulated and edited as necessary the assessment and plan and discussed the findings and management plan with the patient and family.     Clarisa Bloom MD

## 2020-11-10 ENCOUNTER — TRANSFERRED RECORDS (OUTPATIENT)
Dept: HEALTH INFORMATION MANAGEMENT | Facility: CLINIC | Age: 80
End: 2020-11-10

## 2020-12-15 ENCOUNTER — TRANSFERRED RECORDS (OUTPATIENT)
Dept: HEALTH INFORMATION MANAGEMENT | Facility: CLINIC | Age: 80
End: 2020-12-15

## 2021-05-06 ENCOUNTER — TELEPHONE (OUTPATIENT)
Dept: OPHTHALMOLOGY | Facility: CLINIC | Age: 81
End: 2021-05-06

## 2021-05-06 NOTE — TELEPHONE ENCOUNTER
[facility] Call Center    Phone Message    May a detailed message be left on voicemail: yes     Reason for Call: Form or Letter   Type or form/letter needing completion: L[facility] Rx  Provider: Dr. Bloom  Date form needed: ASAP  Once completed: Fax form to: [phone]      Action Taken: Message routed to:  [facility]    Travel Screening: Not Applicable

## 2021-05-25 ENCOUNTER — RECORDS - HEALTHEAST (OUTPATIENT)
Dept: ADMINISTRATIVE | Facility: CLINIC | Age: 81
End: 2021-05-25

## 2021-05-26 ENCOUNTER — RECORDS - HEALTHEAST (OUTPATIENT)
Dept: ADMINISTRATIVE | Facility: CLINIC | Age: 81
End: 2021-05-26

## 2021-05-27 ENCOUNTER — RECORDS - HEALTHEAST (OUTPATIENT)
Dept: ADMINISTRATIVE | Facility: CLINIC | Age: 81
End: 2021-05-27

## 2021-05-30 ENCOUNTER — RECORDS - HEALTHEAST (OUTPATIENT)
Dept: ADMINISTRATIVE | Facility: CLINIC | Age: 81
End: 2021-05-30

## 2022-05-24 ENCOUNTER — OFFICE VISIT (OUTPATIENT)
Dept: OPHTHALMOLOGY | Facility: CLINIC | Age: 82
End: 2022-05-24
Payer: COMMERCIAL

## 2022-05-24 DIAGNOSIS — H52.00 HYPERMETROPIA, UNSPECIFIED LATERALITY: ICD-10-CM

## 2022-05-24 DIAGNOSIS — Z96.1 PSEUDOPHAKIA, BOTH EYES: ICD-10-CM

## 2022-05-24 DIAGNOSIS — H35.3231 EXUDATIVE AGE-RELATED MACULAR DEGENERATION OF BOTH EYES WITH ACTIVE CHOROIDAL NEOVASCULARIZATION (H): Primary | ICD-10-CM

## 2022-05-24 DIAGNOSIS — H04.123 DRY EYES, BILATERAL: ICD-10-CM

## 2022-05-24 PROCEDURE — 92014 COMPRE OPH EXAM EST PT 1/>: CPT | Performed by: OPHTHALMOLOGY

## 2022-05-24 ASSESSMENT — CONF VISUAL FIELD
OS_NORMAL: 1
METHOD: COUNTING FINGERS
OD_NORMAL: 1

## 2022-05-24 ASSESSMENT — REFRACTION_MANIFEST
OD_SPHERE: +0.75
OD_CYLINDER: SPHERE
OS_ADD: +3.00
OS_AXIS: 060
OD_ADD: +3.00
OS_CYLINDER: +0.25
OS_SPHERE: +0.25

## 2022-05-24 ASSESSMENT — REFRACTION_WEARINGRX
OS_SPHERE: +0.50
OS_CYLINDER: +0.25
OD_ADD: +2.75
OD_SPHERE: +0.50
SPECS_TYPE: PAL
OS_ADD: +2.75
OD_CYLINDER: +0.25
OS_AXIS: 060
OD_AXIS: 048

## 2022-05-24 ASSESSMENT — VISUAL ACUITY
OS_CC: 20/70
CORRECTION_TYPE: GLASSES
OS_CC+: -2
METHOD: SNELLEN - LINEAR
OD_CC+: -1
OD_CC: 20/60

## 2022-05-24 ASSESSMENT — SLIT LAMP EXAM - LIDS
COMMENTS: BLEPHARITIS MILD
COMMENTS: BLEPHARITIS MILD

## 2022-05-24 ASSESSMENT — CUP TO DISC RATIO
OD_RATIO: 0.25
OS_RATIO: 0.25

## 2022-05-24 ASSESSMENT — TONOMETRY
OD_IOP_MMHG: 13
IOP_METHOD: ICARE
OS_IOP_MMHG: 10

## 2022-05-24 ASSESSMENT — EXTERNAL EXAM - RIGHT EYE: OD_EXAM: NORMAL

## 2022-05-24 ASSESSMENT — EXTERNAL EXAM - LEFT EYE: OS_EXAM: NORMAL

## 2022-05-24 NOTE — NURSING NOTE
"Chief Complaints and History of Present Illnesses   Patient presents with     Annual Eye Exam     Chief Complaint(s) and History of Present Illness(es)     Annual Eye Exam     Laterality: both eyes    Associated symptoms: Negative for dryness, flashes, floaters and burning    Treatments tried: artificial tears    Pain scale: 0/10              Comments     Patient present for annual exam. Patient states she now has wet macular degeneration in both eyes. Patient would like a stronger rx so she can \"drive more easily\". Patient denies pain and discomfort each eye. Patient states dryness at times, but not presently. Patient states last shots for AMD was about 2 weeks ago each eye.     HETAL Bullock May 24, 2022 12:38 PM               "

## 2022-05-24 NOTE — PROGRESS NOTES
" HPI  Edyta Rivas is a 82 year old female here for comprehensive eye exam.  Has developed new wet age related macular degeneration right eye recently and got first injection with Dr. Biggs a few weeks ago.  Also had injection left eye. 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      Denies eye pain, or irritation. Patient states dryness at times, but not presently.  Patient would like a stronger glasses prescription so she can \"drive more easily\".      PMH: aortic stenosis, hyperlipidemia, on asa  POH: Glasses for hypermetropia, cataract extraction/ intraocular lens implant both eyes 2016, s/p YAG capsulotomy left, dry age related macular degeneration right eye, wet age related macular degeneration left eye - NOEMI Washburn follows  Oc Meds: none  FH: Denies any glaucoma, age related macular degeneration, or other known eye diseases         Assessment & Plan     (H35.3231) Exudative age-related macular degeneration of both eyes with active choroidal neovascularization (H) - Both Eyes  (primary encounter diagnosis)  Comment: follows with Dr. Biggs  Last injection 2 weeks ago both eyes  Plan: 20/60 best corrected visual acuity both eyes discussed with patient should drive below 50mph and daylight hours per state dmv  Continue follow-up with [facility]    (Z96.1) Pseudophakia, both eyes - Both Eyes  Comment: clear media   Plan: follow    (H52.00) Hypermetropia, unspecified laterality - Both Eyes  Comment: small changes   Plan: recheck manifest refraction after a few more injections right eye in case vision improves some  Continue same glasses for now     (H04.123) Dry eyes, bilateral - Both Eyes  Comment: mild with mild Meibomian gland dysfunction   Plan: warm compresses and lid hygiene at bedtime  Artificial tear drops 2-4 times per day as needed "       -----------------------------------------------------------------------------------       Patient disposition:   Return in about 6 months (around 11/24/2022) for Follow Up-blepharitis, AMD OU,  recheck mrx .      Complete documentation of historical and exam elements from today's encounter can be found in the full encounter summary report (not reduplicated in this progress note). I personally obtained the chief complaint(s) and history of present illness.  I have confirmed and edited as necessary the CC, HPI, PMH/PSH, social history, FMH, ROS, and exam/neuro findings as obtained by the technician or others. I have examined this patient myself and I personally viewed the image(s) and studies listed above and the documentation reflects my findings and interpretation.  I formulated and edited as necessary the assessment and plan and discussed the findings and management plan with the patient and family.     Clarisa Bloom MD

## 2022-11-28 ENCOUNTER — OFFICE VISIT (OUTPATIENT)
Dept: OPHTHALMOLOGY | Facility: CLINIC | Age: 82
End: 2022-11-28
Payer: COMMERCIAL

## 2022-11-28 DIAGNOSIS — H52.223 REGULAR ASTIGMATISM OF BOTH EYES: ICD-10-CM

## 2022-11-28 DIAGNOSIS — H01.00B BLEPHARITIS OF UPPER AND LOWER EYELIDS OF BOTH EYES, UNSPECIFIED TYPE: Primary | ICD-10-CM

## 2022-11-28 DIAGNOSIS — H01.00A BLEPHARITIS OF UPPER AND LOWER EYELIDS OF BOTH EYES, UNSPECIFIED TYPE: Primary | ICD-10-CM

## 2022-11-28 DIAGNOSIS — Z96.1 PSEUDOPHAKIA, BOTH EYES: ICD-10-CM

## 2022-11-28 DIAGNOSIS — H04.123 DRY EYES, BILATERAL: ICD-10-CM

## 2022-11-28 DIAGNOSIS — H35.3231 EXUDATIVE AGE-RELATED MACULAR DEGENERATION OF BOTH EYES WITH ACTIVE CHOROIDAL NEOVASCULARIZATION (H): ICD-10-CM

## 2022-11-28 PROCEDURE — 99213 OFFICE O/P EST LOW 20 MIN: CPT | Performed by: OPHTHALMOLOGY

## 2022-11-28 PROCEDURE — 92015 DETERMINE REFRACTIVE STATE: CPT | Performed by: OPHTHALMOLOGY

## 2022-11-28 ASSESSMENT — REFRACTION_WEARINGRX
OS_AXIS: 060
OD_AXIS: 048
OD_SPHERE: +0.50
SPECS_TYPE: PAL
OD_ADD: +2.75
OS_ADD: +2.75
OD_CYLINDER: +0.25
OS_CYLINDER: +0.25
OS_SPHERE: +0.50

## 2022-11-28 ASSESSMENT — REFRACTION_MANIFEST
OD_CYLINDER: +0.25
OS_SPHERE: PLANO
OS_AXIS: 065
OS_CYLINDER: +0.75
OD_AXIS: 040
OD_SPHERE: +0.50
OD_ADD: +3.00
OS_ADD: +3.00

## 2022-11-28 ASSESSMENT — CUP TO DISC RATIO
OS_RATIO: 0.25
OD_RATIO: 0.25

## 2022-11-28 ASSESSMENT — CONF VISUAL FIELD
OS_NORMAL: 1
OS_SUPERIOR_NASAL_RESTRICTION: 0
OS_INFERIOR_NASAL_RESTRICTION: 0
OD_NORMAL: 1
OS_INFERIOR_TEMPORAL_RESTRICTION: 0
OD_SUPERIOR_TEMPORAL_RESTRICTION: 0
METHOD: COUNTING FINGERS
OD_INFERIOR_TEMPORAL_RESTRICTION: 0
OS_SUPERIOR_TEMPORAL_RESTRICTION: 0
OD_SUPERIOR_NASAL_RESTRICTION: 0
OD_INFERIOR_NASAL_RESTRICTION: 0

## 2022-11-28 ASSESSMENT — EXTERNAL EXAM - RIGHT EYE: OD_EXAM: NORMAL

## 2022-11-28 ASSESSMENT — TONOMETRY
OS_IOP_MMHG: 08
IOP_METHOD: ICARE
OD_IOP_MMHG: 10

## 2022-11-28 ASSESSMENT — VISUAL ACUITY
OS_CC: 20/100
OD_CC: 20/100
CORRECTION_TYPE: GLASSES
METHOD: SNELLEN - LINEAR
OS_CC+: -1

## 2022-11-28 ASSESSMENT — EXTERNAL EXAM - LEFT EYE: OS_EXAM: NORMAL

## 2022-11-28 NOTE — NURSING NOTE
Chief Complaints and History of Present Illnesses   Patient presents with     Blepharitis Follow-Up     Pt here for 6 month follow up.     Chief Complaint(s) and History of Present Illness(es)     Blepharitis Follow-Up            Comments: Pt here for 6 month follow up.          Comments    Pt notes vision has decreased since last exam. Last injection was about 2 weeks ago, every time left eye, every other right eye. Both eyes were done at last visit.   Pt using:  Warm compresses BID   Lid scrubs- not using   ATs 2-4x daily each eye   HETAL KAPOOR 9:46 AM November 28, 2022

## 2022-11-28 NOTE — PROGRESS NOTES
HPI  Edyta Rivas is a 82 year old female here for follow-up.   HPI     Blepharitis Follow-Up     Additional comments: Pt here for 6 month follow up.           Comments    Pt notes vision has decreased since last exam. Last injection was about 2 weeks ago, every time left eye, every other right eye. Both eyes were done at last visit.   Pt using:  Warm compresses BID   Lid scrubs- not using   ATs 2-4x daily each eye   HETAL KAPOOR 9:46 AM November 28, 2022             Last edited by Maritza Mukherjee COA on 11/28/2022  9:46 AM.        Denies eye pain, or irritation now, occasional dry eye sensation.  Last intravitreal injections 2 weeks ago.     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 Blepharitis of upper and lower eyelids of both eyes, unspecified type - Both Eyes    2. Dry eyes, bilateral - Both Eyes    3. Pseudophakia, both eyes - Both Eyes    4. Exudative age-related macular degeneration of both eyes with active choroidal neovascularization (H) - Both Eyes    5. Regular astigmatism of both eyes      Small changes in manifest refraction does improve visual acuity some  Manifest refraction done prescription for glasses given, update as needed   Visit with low vision has magnifier for newspaper    Dry eyes, bilateral - Both Eyes with mild Meibomian gland dysfunction   Plan: warm compresses and lid hygiene at bedtime encouraged  Artificial tear drops 2-4 times per day as needed     (H35.3231) Exudative age-related macular degeneration of both eyes with active  choroidal neovascularization (H) - Both Eyes    Comment: follows with Dr. Biggs  Last injection 2 weeks ago both eyes  Plan: [facility]    -----------------------------------------------------------------------------------       Patient disposition:   Return in about 6 months (around 5/28/2023) for Comprehensive Exam.      Complete documentation of historical and exam elements from today's encounter can be found in the full encounter summary report (not reduplicated in this progress note). I personally obtained the chief complaint(s) and history of present illness.  I have confirmed and edited as necessary the CC, HPI, PMH/PSH, social history, FMH, ROS, and exam/neuro findings as obtained by the technician or others. I have examined this patient myself and I personally viewed the image(s) and studies listed above and the documentation reflects my findings and interpretation.  I formulated and edited as necessary the assessment and plan and discussed the findings and management plan with the patient and family.     Clarisa Bloom MD

## 2023-06-05 ENCOUNTER — OFFICE VISIT (OUTPATIENT)
Dept: OPHTHALMOLOGY | Facility: CLINIC | Age: 83
End: 2023-06-05
Payer: COMMERCIAL

## 2023-06-05 VITALS — HEIGHT: 62 IN | WEIGHT: 144 LBS | BODY MASS INDEX: 26.5 KG/M2

## 2023-06-05 DIAGNOSIS — H01.00B BLEPHARITIS OF UPPER AND LOWER EYELIDS OF BOTH EYES, UNSPECIFIED TYPE: ICD-10-CM

## 2023-06-05 DIAGNOSIS — H52.00 HYPERMETROPIA, UNSPECIFIED LATERALITY: ICD-10-CM

## 2023-06-05 DIAGNOSIS — H52.4 PRESBYOPIA OF BOTH EYES: ICD-10-CM

## 2023-06-05 DIAGNOSIS — H01.00A BLEPHARITIS OF UPPER AND LOWER EYELIDS OF BOTH EYES, UNSPECIFIED TYPE: ICD-10-CM

## 2023-06-05 DIAGNOSIS — Z96.1 PSEUDOPHAKIA, BOTH EYES: Primary | ICD-10-CM

## 2023-06-05 DIAGNOSIS — H35.3231 EXUDATIVE AGE-RELATED MACULAR DEGENERATION OF BOTH EYES WITH ACTIVE CHOROIDAL NEOVASCULARIZATION (H): ICD-10-CM

## 2023-06-05 PROCEDURE — 92014 COMPRE OPH EXAM EST PT 1/>: CPT | Performed by: OPHTHALMOLOGY

## 2023-06-05 ASSESSMENT — REFRACTION_MANIFEST
OS_ADD: +3.00
OD_AXIS: 035
OS_SPHERE: +0.25
OD_SPHERE: +0.75
OS_AXIS: 068
OD_CYLINDER: +0.50
OS_CYLINDER: +0.75
OD_ADD: +3.00

## 2023-06-05 ASSESSMENT — VISUAL ACUITY
OD_CC: 20/800
CORRECTION_TYPE: GLASSES
OD_CC+: -2
METHOD: SNELLEN - LINEAR
OD_CC: 20/50
OS_CC: 20/100
OS_CC: J10-1

## 2023-06-05 ASSESSMENT — CONF VISUAL FIELD
COMMENTS: PEAKING OU
OS_INFERIOR_TEMPORAL_RESTRICTION: 0
METHOD: COUNTING FINGERS
OS_NORMAL: 1
OD_SUPERIOR_NASAL_RESTRICTION: 0
OD_INFERIOR_TEMPORAL_RESTRICTION: 0
OS_INFERIOR_NASAL_RESTRICTION: 0
OD_SUPERIOR_TEMPORAL_RESTRICTION: 0
OD_NORMAL: 1
OS_SUPERIOR_TEMPORAL_RESTRICTION: 0
OD_INFERIOR_NASAL_RESTRICTION: 0
OS_SUPERIOR_NASAL_RESTRICTION: 0

## 2023-06-05 ASSESSMENT — REFRACTION_WEARINGRX
OD_AXIS: 040
OS_ADD: +2.50
OS_CYLINDER: +0.25
OD_CYLINDER: +0.25
OS_SPHERE: +0.50
OD_ADD: +2.75
OS_AXIS: 060
OD_SPHERE: +0.50
SPECS_TYPE: PAL

## 2023-06-05 ASSESSMENT — TONOMETRY
OD_IOP_MMHG: 14
OS_IOP_MMHG: 15
IOP_METHOD: TONOPEN

## 2023-06-05 ASSESSMENT — CUP TO DISC RATIO
OD_RATIO: 0.25
OS_RATIO: 0.25

## 2023-06-05 ASSESSMENT — EXTERNAL EXAM - LEFT EYE: OS_EXAM: NORMAL

## 2023-06-05 ASSESSMENT — EXTERNAL EXAM - RIGHT EYE: OD_EXAM: NORMAL

## 2023-06-05 NOTE — NURSING NOTE
Chief Complaints and History of Present Illnesses   Patient presents with     COMPREHENSIVE EYE EXAM     Comprensive Exam.      Chief Complaint(s) and History of Present Illness(es)     COMPREHENSIVE EYE EXAM            Laterality: both eyes    Associated symptoms: Negative for dryness, eye pain, tearing and discharge    Treatments tried: artificial tears    Pain scale: 3/10    Comments: Comprensive Exam.           Comments    Lids ache at times yet with the Blepharitis but tolerates. Doing the warm compress and lid scrub and this is some help.   Continues to see Dr Biggs for Exudative age-related macular degeneration of both eyes with active choroidal neovascularization last injection last week each eye.  Vision each eye continues to decrease.     Some occasional eye pain in either eye with no pattern.     Rosa Walker, COT COT 9:11 AM June 5, 2023

## 2023-06-05 NOTE — PROGRESS NOTES
HPI  Edyta Rivas is a 83 year old female here for follow-up.   HPI     COMPREHENSIVE EYE EXAM    In both eyes.  Associated symptoms include Negative for dryness, eye pain, tearing and discharge.  Treatments tried include artificial tears.  Pain was noted as 3/10. Additional comments: Comprensive Exam.            Comments    Lids ache at times yet with the Blepharitis but tolerates. Doing the warm compress and lid scrub and this is some help.   Continues to see Dr Biggs for Exudative age-related macular degeneration of both eyes with active choroidal neovascularization last injection last week each eye.  Vision each eye continues to decrease.     Some occasional eye pain in either eye with no pattern.     KASSIE Gibbs COT 9:11 AM June 5, 2023               Last edited by Rosa Walker COT on 6/5/2023  9:11 AM.        Denies eye pain, or irritation now, occasional dry eye sensation. Reads with Rubens enlarged.  Got new glasses after 11/22 appointment.  Last intravitreal injections last week.  Feels vision slowly declining.     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 Pseudophakia, both eyes - Both Eyes    2. Blepharitis of upper and lower eyelids of both eyes, unspecified type - Both Eyes    3. Hypermetropia, unspecified laterality - Both Eyes    4. Presbyopia of both eyes - Both Eyes    5. Exudative age-related macular degeneration of both eyes with active choroidal neovascularization (H) - Both Eyes      (Z96.1) Pseudophakia of both eyes - Both Eyes  Comment: stable    Plan: observe     Blepharitis , bilateral -   Plan: warm compresses and lid hygiene at bedtime encouraged  Artificial tear drops 2-4 times per day as needed   (Try systane balance drops)    Hypermetropia/presbyopia-  Small changes in manifest refraction does not improve visual acuity, meeting needs with reading aides/magnifier/delon.      Exudative age-related macular degeneration of both eyes with active choroidal neovascularization (H) - Both Eyes    Comment: follows with Dr. Biggs  Last injection 1 week ago both eyes, no signs of complications  Great iop  Plan: Continue follow[facility] as planned    -----------------------------------------------------------------------------------       Patient disposition:   Return in about 6 months (around 12/5/2023) for Comprehensive Exam, OCT macula OU.      Complete documentation of historical and exam elements from today's encounter can be found in the full encounter summary report (not reduplicated in this progress note). I personally obtained the chief complaint(s) and history of present illness.  I have confirmed and edited as necessary the CC, HPI, PMH/PSH, social history, FMH, ROS, and exam/neuro findings as obtained by the technician or others. I have examined this patient myself and I personally viewed the image(s) and studies listed above and the documentation reflects my findings and interpretation.  I formulated and edited as necessary the assessment and plan and discussed the findings and management plan with the patient and family.     Clarisa Bloom MD

## 2024-06-03 ENCOUNTER — HOSPITAL ENCOUNTER (EMERGENCY)
Facility: CLINIC | Age: 84
Discharge: HOME | End: 2024-06-04
Payer: COMMERCIAL

## 2024-06-19 ENCOUNTER — TELEPHONE (OUTPATIENT)
Dept: OPHTHALMOLOGY | Facility: CLINIC | Age: 84
End: 2024-06-19
Payer: COMMERCIAL

## 2024-06-19 NOTE — TELEPHONE ENCOUNTER
Patient confirmed rescheduled appointment:  Date: 9/5/24  Time: 1:40pm  Visit type: RETURN ADULT EYE  Provider:   Location: [facility] Location  Testing/imaging: Comprehensive Exam, OCT macula OU   Additional notes: Return in about 6 months (around 12/5/2023) for Comprehensive Exam, OCT macula OU , recs in epic, [facility]-Appt Per PT     Patient is added to wait-list and is aware of appointment details.-Per Patient

## 2024-08-09 ENCOUNTER — TELEPHONE (OUTPATIENT)
Dept: OPHTHALMOLOGY | Facility: CLINIC | Age: 84
End: 2024-08-09
Payer: COMMERCIAL

## 2024-08-09 NOTE — TELEPHONE ENCOUNTER
Spoke with patient regarding sooner appointment from the wait-list. Patient declined scheduling as offered but remains on the wait-list. -Per Patient

## 2024-09-05 ENCOUNTER — OFFICE VISIT (OUTPATIENT)
Dept: OPHTHALMOLOGY | Facility: CLINIC | Age: 84
End: 2024-09-05
Payer: COMMERCIAL

## 2024-09-05 DIAGNOSIS — Z96.1 PSEUDOPHAKIA, BOTH EYES: ICD-10-CM

## 2024-09-05 DIAGNOSIS — H04.123 DRY EYES, BILATERAL: Primary | ICD-10-CM

## 2024-09-05 DIAGNOSIS — H35.3231 EXUDATIVE AGE-RELATED MACULAR DEGENERATION OF BOTH EYES WITH ACTIVE CHOROIDAL NEOVASCULARIZATION (H): ICD-10-CM

## 2024-09-05 DIAGNOSIS — H52.4 PRESBYOPIA OF BOTH EYES: ICD-10-CM

## 2024-09-05 PROCEDURE — 92015 DETERMINE REFRACTIVE STATE: CPT | Performed by: OPHTHALMOLOGY

## 2024-09-05 PROCEDURE — 92134 CPTRZ OPH DX IMG PST SGM RTA: CPT | Performed by: OPHTHALMOLOGY

## 2024-09-05 PROCEDURE — 99213 OFFICE O/P EST LOW 20 MIN: CPT | Performed by: OPHTHALMOLOGY

## 2024-09-05 RX ORDER — CYCLOSPORINE 0.5 MG/ML
1 EMULSION OPHTHALMIC 2 TIMES DAILY
COMMUNITY

## 2024-09-05 ASSESSMENT — CONF VISUAL FIELD
OD_SUPERIOR_NASAL_RESTRICTION: 0
OD_INFERIOR_NASAL_RESTRICTION: 0
OS_INFERIOR_TEMPORAL_RESTRICTION: 0
METHOD: COUNTING FINGERS
OD_NORMAL: 1
OS_NORMAL: 1
OD_SUPERIOR_TEMPORAL_RESTRICTION: 0
OS_SUPERIOR_TEMPORAL_RESTRICTION: 0
OD_INFERIOR_TEMPORAL_RESTRICTION: 0
OS_INFERIOR_NASAL_RESTRICTION: 0
OS_SUPERIOR_NASAL_RESTRICTION: 0

## 2024-09-05 ASSESSMENT — VISUAL ACUITY
METHOD: SNELLEN - LINEAR
OS_CC: J10
OD_CC+: -1
CORRECTION_TYPE: GLASSES
OS_CC+: -1
OD_CC: 20/40
OD_CC: J16
OS_CC: 20/100

## 2024-09-05 ASSESSMENT — EXTERNAL EXAM - LEFT EYE: OS_EXAM: NORMAL

## 2024-09-05 ASSESSMENT — REFRACTION_WEARINGRX
OD_SPHERE: +0.50
OS_CYLINDER: +0.25
OS_SPHERE: +0.50
OS_AXIS: 060
SPECS_TYPE: PAL
OD_AXIS: 040
OD_ADD: +2.75
OS_ADD: +2.75
OD_CYLINDER: +0.25

## 2024-09-05 ASSESSMENT — REFRACTION_MANIFEST
OS_CYLINDER: +0.50
OD_SPHERE: +0.50
OS_SPHERE: PLANO
OD_CYLINDER: +0.50
OD_ADD: +3.25
OD_AXIS: 030
OS_AXIS: 060
OS_ADD: +3.25

## 2024-09-05 ASSESSMENT — TONOMETRY
OS_IOP_MMHG: 11
OD_IOP_MMHG: 10
IOP_METHOD: TONOPEN

## 2024-09-05 ASSESSMENT — CUP TO DISC RATIO
OS_RATIO: 0.25
OD_RATIO: 0.25

## 2024-09-05 ASSESSMENT — EXTERNAL EXAM - RIGHT EYE: OD_EXAM: NORMAL

## 2024-09-05 NOTE — PROGRESS NOTES
HPI  Edyta Rivas is a 84 year old female here for follow-up.   HPI       COMPREHENSIVE EYE EXAM    In both eyes.  Treatments tried include eye drops. Additional comments: Comprehensive exam              Comments    3 weeks ago saw Dr Biggs and had injections each eye.   Feel visions is down each eye since last visit each eye.     Uses Restasis and Lubricants PRN each eye and  this does keep each eye comfortable.     Rosa Walker, COT COT 1:37 PM September 5, 2024                   Last edited by Rosa Walker COT on 9/5/2024  1:37 PM.          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 Dry eyes, bilateral    2. Exudative age-related macular degeneration of both eyes with active choroidal neovascularization (H) - Both Eyes    3. Presbyopia of both eyes - Both Eyes    4. Pseudophakia, both eyes - Both Eyes        Dry eye both eyes -  Plan: stable with current drops  warm compresses and lid hygiene at bedtime encouraged  Continue Artificial tear drops 2-4 times per day as needed   Restasis twice a day     Exudative age-related macular degeneration of both eyes with active choroidal neovascularization (H) - Both Eyes    Comment: follows with Dr. Biggs  Plan: Continue follow[facility] as planned    (Z96.1) Pseudophakia of both eyes - Both Eyes  Comment: stable   Plan: observe     Presbyopia - both eyes   Small changes in manifest refraction does not improve visual acuity, meeting needs with reading aides/magnifier/delon.  Manifest refraction done and prescription  can be used for update if desired.      -----------------------------------------------------------------------------------       Patient disposition:   Return in about 1 year (around 9/5/2025) for Comprehensive Exam.      Complete documentation of historical and exam elements from today's encounter can be found in the full encounter summary report (not reduplicated in this progress note). I personally obtained the chief complaint(s) and history of present illness.  I have confirmed and edited as necessary the CC, HPI, PMH/PSH, social history, FMH, ROS, and exam/neuro findings as obtained by the technician or others. I have examined this patient myself and I personally viewed the image(s) and studies listed above and the documentation reflects my findings and interpretation.  I formulated and edited as necessary the assessment and plan and discussed the findings and management plan with the patient and family.     Clarisa Bloom MD

## 2024-09-05 NOTE — NURSING NOTE
Chief Complaints and History of Present Illnesses   Patient presents with    COMPREHENSIVE EYE EXAM     Comprehensive exam      Chief Complaint(s) and History of Present Illness(es)       COMPREHENSIVE EYE EXAM              Laterality: both eyes    Treatments tried: eye drops    Comments: Comprehensive exam               Comments    3 weeks ago saw Dr Biggs and had injections each eye.   Feel visions is down each eye since last visit each eye.     Uses Restasis and Lubricants PRN each eye and  this does keep each eye comfortable.     Rosa Walker, COT COT 1:37 PM September 5, 2024